# Patient Record
Sex: MALE | HISPANIC OR LATINO | Employment: FULL TIME | URBAN - METROPOLITAN AREA
[De-identification: names, ages, dates, MRNs, and addresses within clinical notes are randomized per-mention and may not be internally consistent; named-entity substitution may affect disease eponyms.]

---

## 2018-09-13 ENCOUNTER — HOSPITAL ENCOUNTER (EMERGENCY)
Facility: MEDICAL CENTER | Age: 63
End: 2018-09-13
Attending: EMERGENCY MEDICINE
Payer: COMMERCIAL

## 2018-09-13 VITALS
HEART RATE: 72 BPM | HEIGHT: 66 IN | BODY MASS INDEX: 24.27 KG/M2 | DIASTOLIC BLOOD PRESSURE: 82 MMHG | OXYGEN SATURATION: 96 % | WEIGHT: 151.01 LBS | RESPIRATION RATE: 16 BRPM | SYSTOLIC BLOOD PRESSURE: 131 MMHG | TEMPERATURE: 98.2 F

## 2018-09-13 DIAGNOSIS — M25.461 EFFUSION OF RIGHT KNEE: ICD-10-CM

## 2018-09-13 PROCEDURE — 99284 EMERGENCY DEPT VISIT MOD MDM: CPT

## 2018-09-13 RX ORDER — METHYLPREDNISOLONE 4 MG/1
TABLET ORAL
Qty: 21 TAB | Refills: 0 | Status: SHIPPED | OUTPATIENT
Start: 2018-09-13

## 2018-09-13 ASSESSMENT — LIFESTYLE VARIABLES: DO YOU DRINK ALCOHOL: NO

## 2018-09-13 ASSESSMENT — PAIN SCALES - GENERAL: PAINLEVEL_OUTOF10: 9

## 2018-09-13 NOTE — ED NOTES
Pt ambulated to lobby for d/c. Skin WDI, RR E/U, NAD. Pt verbalizes understanding of d/c instructions, follow up information and medications.

## 2018-09-13 NOTE — DISCHARGE INSTRUCTIONS
Knee Effusion  Introduction  Knee effusion means that you have excess fluid in your knee joint. This can cause pain and swelling in your knee. This may make your knee more difficult to bend and move. That is because there is increased pain and pressure in the joint. If there is fluid in your knee, it often means that something is wrong inside your knee, such as severe arthritis, abnormal inflammation, or an infection. Another common cause of knee effusion is an injury to the knee muscles, ligaments, or cartilage.  Follow these instructions at home:  · Use crutches as directed by your health care provider.  · Wear a knee brace as directed by your health care provider.  · Apply ice to the swollen area:  ¨ Put ice in a plastic bag.  ¨ Place a towel between your skin and the bag.  ¨ Leave the ice on for 20 minutes, 2-3 times per day.  · Keep your knee raised (elevated) when you are sitting or lying down.  · Take medicines only as directed by your health care provider.  · Do any rehabilitation or strengthening exercises as directed by your health care provider.  · Rest your knee as directed by your health care provider. You may start doing your normal activities again when your health care provider approves.  · Keep all follow-up visits as directed by your health care provider. This is important.  Contact a health care provider if:  · You have ongoing (persistent) pain in your knee.  Get help right away if:  · You have increased swelling or redness of your knee.  · You have severe pain in your knee.  · You have a fever.  This information is not intended to replace advice given to you by your health care provider. Make sure you discuss any questions you have with your health care provider.  Document Released: 03/09/2005 Document Revised: 05/25/2017 Document Reviewed: 08/03/2015  © 2017 [employer]

## 2018-09-13 NOTE — ED NOTES
Pt to ED for right knee pain; denies trauma; reports years ago having to get fluid drained; no obvious deformity noted; able to ambulate; took naproxen at home with no relief; no swelling noted.

## 2018-09-13 NOTE — ED PROVIDER NOTES
"ED Provider Note    Scribed for Aashish Porter M.D. by El Meyer. 9/13/2018, 8:03 AM.    Primary care provider: Pcp Pt States None  Means of arrival: walk in  History obtained from: Patient  History limited by: None    CHIEF COMPLAINT  Chief Complaint   Patient presents with   • Knee Pain     R knee pain, denies trauma        HPI  Domo Anders is a 63 y.o. male who presents to the Emergency Department complaining of gradually worsening right knee pain. He localizes his pain over the right laeral patella. Patient reports associated lateral knee swelling. He reports a history of a history of right knee injury 40 years ago and states that he has had intermittent symptoms in his knee since. Patient denies trauma, numbness, weakness.     REVIEW OF SYSTEMS  Pertinent positives include knee pain, knee swelling.   Pertinent negatives include no trauma, numbness, weakness.    E.     PAST MEDICAL HISTORY   has a past medical history of Diabetes (HCC).    SURGICAL HISTORY  patient denies any surgical history    SOCIAL HISTORY  Social History   Substance Use Topics   • Smoking status: Former Smoker     Quit date: 9/13/2010   • Smokeless tobacco: Not on file   • Alcohol use No      History   Drug Use No       FAMILY HISTORY  None noted    CURRENT MEDICATIONS  Home Medications     Reviewed by Geni Shen R.N. (Registered Nurse) on 09/13/18 at 0735  Med List Status: Not Addressed   Medication Last Dose Status        Patient Azam Taking any Medications                       ALLERGIES  No Known Allergies    PHYSICAL EXAM  VITAL SIGNS: /91   Pulse 78   Temp 36.8 °C (98.2 °F)   Resp 16   Ht 1.676 m (5' 6\")   Wt 68.5 kg (151 lb 0.2 oz)   SpO2 98%   BMI 24.37 kg/m²     Nursing note and vitals reviewed.  Constitutional: No distress.   HENT: Head is atraumatic. Oropharynx is moist.   Eyes: Conjunctivae are normal. Pupils are equal, round, and reactive to light.   Cardiovascular: Normal peripheral " perfusion  Respiratory: No respiratory distress.   Musculoskeletal: Normal range of motion. Light right knee effusion. No overlying erythema or warmth.   Neurological: Alert. No focal deficits noted.    Skin: No rash.   Psych: Appropriate for clinical situation     DIAGNOSTIC STUDIES / PROCEDURES    COURSE & MEDICAL DECISION MAKING  Nursing notes, VS, PMSFHx reviewed in chart.    8:03 AM - Patient seen and examined at bedside. He presents today requesting drainage of effusion to his right knee. I discussed follow up with an orthopedist in order to perform this procedure. He will be discharged with follow up instructions and strict return precautions. Patient will be placed in a knee immobilizer and crutches for discharge. Patient verbalizes understanding and agreement to this plan of care. Patient will be prescribed Medrol.     DISPOSITION:  Patient will be discharged home in stable condition.    FOLLOW UP:  [facility], Emergency Dept  [address] [id number] [phone]    If symptoms worsen    Roger Rosa M.D.  [address] [address]  [phone]    Schedule an appointment as soon as possible for a visit  call today      OUTPATIENT MEDICATIONS:  New Prescriptions    METHYLPREDNISOLONE (MEDROL) 4 MG TAB    Take as per the package instructions.       The patient was discharged home with an information sheet on knee effusion and told to return immediately for any signs or symptoms listed.  The patient agreed to the discharge precautions and follow-up plan which is documented in EPIC.    FINAL IMPRESSION  1. Effusion of right knee        El RICH ([employer]acle), am scribing for, and in the presence of, Aashish Porter M.D..    Electronically signed by: El Meyer ([place]), 9/13/2018    Aashish RICH M.D. personally performed the services described in this documentation, as scribed by El Meyer in my presence, and it is both accurate  and complete.    The note accurately reflects work and decisions made by me.  Aashish Porter  9/13/2018  11:14 AM

## 2018-09-13 NOTE — ED NOTES
"Pt slowly ambulates to triage with c/c R knee pain, denies trauma.  Reports have \"fluid drained from knee many months ago.\"  Also reports surgery to knee 40 years ago.  A&ox4.  Pt to lobby & advised to inform RN of any changes.  "

## 2018-12-07 ENCOUNTER — HOSPITAL ENCOUNTER (OUTPATIENT)
Dept: LAB | Facility: MEDICAL CENTER | Age: 63
End: 2018-12-07
Attending: PHYSICIAN ASSISTANT
Payer: COMMERCIAL

## 2018-12-07 LAB
APPEARANCE FLD: NORMAL
BODY FLD TYPE: NORMAL
BODY FLD TYPE: NORMAL
COLOR FLD: YELLOW
CRYSTALS FLD MICRO: NORMAL
CSF COMMENTS 1658: NORMAL
GRAM STN SPEC: NORMAL
LYMPHOCYTES NFR FLD: 31 %
MONONUC CELLS NFR FLD: 2 %
NEUTROPHILS NFR FLD: 67 %
RBC # FLD: 3000 CELLS/UL
SIGNIFICANT IND 70042: NORMAL
SITE SITE: NORMAL
SOURCE SOURCE: NORMAL
URATE FLD-MCNC: 6.2 MG/DL
WBC # FLD: NORMAL CELLS/UL

## 2018-12-07 PROCEDURE — 89060 EXAM SYNOVIAL FLUID CRYSTALS: CPT

## 2018-12-07 PROCEDURE — 87205 SMEAR GRAM STAIN: CPT

## 2018-12-07 PROCEDURE — 87070 CULTURE OTHR SPECIMN AEROBIC: CPT

## 2018-12-07 PROCEDURE — 84560 ASSAY OF URINE/URIC ACID: CPT

## 2018-12-07 PROCEDURE — 89051 BODY FLUID CELL COUNT: CPT

## 2018-12-11 LAB
BACTERIA FLD AEROBE CULT: NORMAL
GRAM STN SPEC: NORMAL
SIGNIFICANT IND 70042: NORMAL
SITE SITE: NORMAL
SOURCE SOURCE: NORMAL

## 2020-02-12 ENCOUNTER — HOSPITAL ENCOUNTER (OUTPATIENT)
Dept: LAB | Facility: MEDICAL CENTER | Age: 65
End: 2020-02-12
Attending: ORTHOPAEDIC SURGERY
Payer: COMMERCIAL

## 2021-03-03 DIAGNOSIS — Z23 NEED FOR VACCINATION: ICD-10-CM
